# Patient Record
(demographics unavailable — no encounter records)

---

## 2024-10-25 NOTE — BEGINNING OF VISIT
[3] : 2) Feeling down, depressed, or hopeless for nearly every day (3) [PHQ-9 Negative] : PHQ-9 Negative [Nearly Every Day (3)] : 2.) Feeling down, depressed or hopeless? Nearly every day [Several Days (1)] : 4.) Feeling tired or having little energy? Several days [Not at All (0)] : 9.) Thoughts that you would be off dead or of hurting yourself in some way? Not at all [Mild] : Severity of Depression is Mild [Somewhat Difficult] : How difficult have these problems made it for you to do your work, take care of things at home, or get along with people? Somewhat difficult [Provided Coping Management Support] : Provided Coping Management Support [XAH8Gmaga] : 6 [[id number]] : 8 [Pain Scale: ___] : On a scale of 1-10, today the patient's pain is a(n) [unfilled]. [Never] : Never [With Patient/Caregiver] : with Patient/Caregiver [FreeTextEntry7] : na

## 2024-10-25 NOTE — BEGINNING OF VISIT
[3] : 2) Feeling down, depressed, or hopeless for nearly every day (3) [PHQ-9 Negative] : PHQ-9 Negative [Nearly Every Day (3)] : 2.) Feeling down, depressed or hopeless? Nearly every day [Several Days (1)] : 4.) Feeling tired or having little energy? Several days [Not at All (0)] : 9.) Thoughts that you would be off dead or of hurting yourself in some way? Not at all [Mild] : Severity of Depression is Mild [Somewhat Difficult] : How difficult have these problems made it for you to do your work, take care of things at home, or get along with people? Somewhat difficult [Provided Coping Management Support] : Provided Coping Management Support [AOH6Uzoww] : 6 [[id number]] : 8 [Pain Scale: ___] : On a scale of 1-10, today the patient's pain is a(n) [unfilled]. [Never] : Never [With Patient/Caregiver] : with Patient/Caregiver [FreeTextEntry7] : na

## 2024-10-25 NOTE — BEGINNING OF VISIT
[3] : 2) Feeling down, depressed, or hopeless for nearly every day (3) [PHQ-9 Negative] : PHQ-9 Negative [Nearly Every Day (3)] : 2.) Feeling down, depressed or hopeless? Nearly every day [Several Days (1)] : 4.) Feeling tired or having little energy? Several days [Not at All (0)] : 9.) Thoughts that you would be off dead or of hurting yourself in some way? Not at all [Mild] : Severity of Depression is Mild [Somewhat Difficult] : How difficult have these problems made it for you to do your work, take care of things at home, or get along with people? Somewhat difficult [Provided Coping Management Support] : Provided Coping Management Support [[id number]] : 6 [[id number]] : 8 [Pain Scale: ___] : On a scale of 1-10, today the patient's pain is a(n) [unfilled]. [Never] : Never [With Patient/Caregiver] : with Patient/Caregiver [FreeTextEntry7] : na

## 2024-10-28 NOTE — REASON FOR VISIT
[Initial Consultation] : an initial consultation [Family Member] : family member [FreeTextEntry2] : newly diagnosed endometrial cancer

## 2024-10-28 NOTE — HISTORY OF PRESENT ILLNESS
[Disease: _____________________] : Disease: [unfilled] [AJCC Stage: ____] : AJCC Stage: [unfilled] [de-identified] : Madalyn is an 81 year old woman referred by Dr. Bañuelos for newly diagnosed endometrial cancer. She initially had vaginal spotting earlier this year and she went to her gyn who did EMBx which showed cancer and she was referred to Dr. Bañuelos. Due to her multiple medical issues, she was unable to be cleared by cardiology for hysterectomy/staging. History of CHF with EF of 20%  EMB 6/26/2024 [facility] review requested HIGH-GRADE ENDOMETRIAL ADENOCARCINOMA, MOST CONSISTEN WITH CLEAR CELL CARCINOMA *Intact//normal nuclear expression of MMR proteins MLH1, MSH2, MSH6, and PMS2 in tumor cell nuclei (control intact). These results suggest a low probability of Gregory syndrome or sporadic MSI-high tumor.  CT c/a/p 7/31/24: IMPRESSION: Marked endometrial thickening in keeping with stated history of tumor. Small foci of gas, possibly related to recent procedure. Correlate with clinical history. Complex right renal mass with concern for solid nodular component, as on the prior study, concerning for primary renal malignancy.  She reports new vaginal bleeding or 2 weeks. She walks with walker due to fatigue.  No recent falls. She has weakness, unable to complete most ADLs independently.  Prior to CVA she was independent and lived on her own. Since then she lives with her daughter.   No allergies. PMHx: CVA (February 2024), CHF, DM (currently well controlled), gout, Afib, HTN, HLD PSHx:RA LSC cholecystectomy with intraop cholangiography (2021) Family hx: aunt with colon cancer (60s)   with one daughter. No h/o smoking. Social alcohol use.  [de-identified] : Madalyn is dependent on all ADLs at this time. She was not cleared by cardiology for surgery.

## 2024-10-28 NOTE — HISTORY OF PRESENT ILLNESS
[Disease: _____________________] : Disease: [unfilled] [AJCC Stage: ____] : AJCC Stage: [unfilled] [de-identified] : Madalyn is an 81 year old woman referred by Dr. Bañuelos for newly diagnosed endometrial cancer. She initially had vaginal spotting earlier this year and she went to her gyn who did EMBx which showed cancer and she was referred to Dr. Bañuelos. Due to her multiple medical issues, she was unable to be cleared by cardiology for hysterectomy/staging. History of CHF with EF of 20%  EMB 6/26/2024 [facility] review requested HIGH-GRADE ENDOMETRIAL ADENOCARCINOMA, MOST CONSISTEN WITH CLEAR CELL CARCINOMA *Intact//normal nuclear expression of MMR proteins MLH1, MSH2, MSH6, and PMS2 in tumor cell nuclei (control intact). These results suggest a low probability of Gregory syndrome or sporadic MSI-high tumor.  CT c/a/p 7/31/24: IMPRESSION: Marked endometrial thickening in keeping with stated history of tumor. Small foci of gas, possibly related to recent procedure. Correlate with clinical history. Complex right renal mass with concern for solid nodular component, as on the prior study, concerning for primary renal malignancy.  She reports new vaginal bleeding or 2 weeks. She walks with walker due to fatigue.  No recent falls. She has weakness, unable to complete most ADLs independently.  Prior to CVA she was independent and lived on her own. Since then she lives with her daughter.   No allergies. PMHx: CVA (February 2024), CHF, DM (currently well controlled), gout, Afib, HTN, HLD PSHx:RA LSC cholecystectomy with intraop cholangiography (2021) Family hx: aunt with colon cancer (60s)   with one daughter. No h/o smoking. Social alcohol use.  [de-identified] : Madalyn is dependent on all ADLs at this time. She was not cleared by cardiology for surgery.

## 2024-10-28 NOTE — HISTORY OF PRESENT ILLNESS
[Disease: _____________________] : Disease: [unfilled] [AJCC Stage: ____] : AJCC Stage: [unfilled] [de-identified] : Madalyn is an 81 year old woman referred by Dr. Bañuelos for newly diagnosed endometrial cancer. She initially had vaginal spotting earlier this year and she went to her gyn who did EMBx which showed cancer and she was referred to Dr. Bañuelos. Due to her multiple medical issues, she was unable to be cleared by cardiology for hysterectomy/staging. History of CHF with EF of 20%  EMB 6/26/2024 [facility] review requested HIGH-GRADE ENDOMETRIAL ADENOCARCINOMA, MOST CONSISTEN WITH CLEAR CELL CARCINOMA *Intact//normal nuclear expression of MMR proteins MLH1, MSH2, MSH6, and PMS2 in tumor cell nuclei (control intact). These results suggest a low probability of Gregory syndrome or sporadic MSI-high tumor.  CT c/a/p 7/31/24: IMPRESSION: Marked endometrial thickening in keeping with stated history of tumor. Small foci of gas, possibly related to recent procedure. Correlate with clinical history. Complex right renal mass with concern for solid nodular component, as on the prior study, concerning for primary renal malignancy.  She reports new vaginal bleeding or 2 weeks. She walks with walker due to fatigue.  No recent falls. She has weakness, unable to complete most ADLs independently.  Prior to CVA she was independent and lived on her own. Since then she lives with her daughter.   No allergies. PMHx: CVA (February 2024), CHF, DM (currently well controlled), gout, Afib, HTN, HLD PSHx:RA LSC cholecystectomy with intraop cholangiography (2021) Family hx: aunt with colon cancer (60s)   with one daughter. No h/o smoking. Social alcohol use.  [de-identified] : Madalyn is dependent on all ADLs at this time. She was not cleared by cardiology for surgery.

## 2024-10-28 NOTE — CONSULT LETTER
[Dear  ___] : Dear  [unfilled], [Consult Letter:] : I had the pleasure of evaluating your patient, [unfilled]. [Consult Closing:] : Thank you very much for allowing me to participate in the care of this patient.  If you have any questions, please do not hesitate to contact me. [Sincerely,] : Sincerely, [DrLuis  ___] : Dr. SOLORZANO

## 2024-10-30 NOTE — VITALS
[Maximal Pain Intensity: 0/10] : 0/10 [70: Cares for self; unalbe to carry on normal activity or do active work.] : 70: Cares for self; unable to carry on normal activity or do active work. [Date: ____________] : Patient's last distress assessment performed on [unfilled]. [7 - Distress Level] : Distress Level: 7

## 2024-11-06 NOTE — REVIEW OF SYSTEMS
[Fatigue] : fatigue [Constipation] : constipation [Difficulty Walking] : difficulty walking [Negative] : Heme/Lymph [FreeTextEntry8] : as noted  [de-identified] : uses walker

## 2024-11-06 NOTE — PHYSICAL EXAM
[Normal] : well developed, well nourished, in no acute distress [] : no respiratory distress [Respiration, Rhythm And Depth] : normal respiratory rhythm and effort [Edema] : no peripheral edema present [Abdomen Soft] : soft [Abdomen Tenderness] : non-tender [Oriented To Time, Place, And Person] : oriented to person, place, and time [Affect] : the affect was normal [de-identified] : ambulates with walker

## 2024-11-06 NOTE — HISTORY OF PRESENT ILLNESS
[FreeTextEntry1] : Ms. 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   Her HPI as I understand it is summarized below:  She initially had vaginal spotting earlier this year. An EMBx showed high-grade endometrial adenocarcinoma most consistent with clear cell carcinoma, pMMR, and she was referred to Dr. Bañuelos by her gyn. Unfortunately, she was not cleared for surgery by her cardiologist.   She had a CT C/A/P on 7/31/24 which showed marked endometrial thickening as well as a complex right renal mass with concern for solid nodular component concerning for a primary renal mass.   10/25/24: Saw Dr. Werner in consultation- recommended against chemotherapy at this time due to her CHF. Recommended local therapy with RT with the possibility of adjuvant single agent chemotherapy if there is residual disease after RT.   10/30/24: Presents today to discuss radiation therapy for her incompletely staged UPSC. She is accompanied by her daughter. She is generally feeling well, although fatigued. She had a few episodes of vaginal bleeding since May, 2024; she currently has light vaginal bleeding that has been ongoing for the past 2-3 weeks. She has no abdominal or pelvic pain, some constipation, no diarrhea, no urinary issues.    PMHx: CVA (February 2024), HFrEF (20%), DM (currently well controlled), gout, Afib, HTN, HLD PSHx:RA LSC cholecystectomy with intraop cholangiography (2021) Family hx: aunt with colon cancer (60s)

## 2024-11-06 NOTE — HISTORY OF PRESENT ILLNESS
[FreeTextEntry1] : Ms. 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   Her HPI as I understand it is summarized below:  She initially had vaginal spotting earlier this year. An EMBx showed high-grade endometrial adenocarcinoma most consistent with clear cell carcinoma, pMMR, and she was referred to Dr. Bañuelos by her gyn. Unfortunately, she was not cleared for surgery by her cardiologist.   She had a CT C/A/P on 7/31/24 which showed marked endometrial thickening as well as a complex right renal mass with concern for solid nodular component concerning for a primary renal mass.   10/25/24: Saw Dr. Werner in consultation- recommended against chemotherapy at this time due to her CHF. Recommended local therapy with RT with the possibility of adjuvant single agent chemotherapy if there is residual disease after RT.   10/30/24: Presents today to discuss radiation therapy for her incompletely staged UPSC. She is accompanied by her daughter. She is generally feeling well, although fatigued. She had a few episodes of vaginal bleeding since May, 2024; she currently has light vaginal bleeding that has been ongoing for the past 2-3 weeks. She has no abdominal or pelvic pain, some constipation, no diarrhea, no urinary issues.    PMHx: CVA (February 2024), HFrEF (20%), DM (currently well controlled), gout, Afib, HTN, HLD PSHx:RA LSC cholecystectomy with intraop cholangiography (2021) Family hx: aunt with colon cancer (60s)

## 2024-11-06 NOTE — REVIEW OF SYSTEMS
[Fatigue] : fatigue [Constipation] : constipation [Difficulty Walking] : difficulty walking [Negative] : Heme/Lymph [FreeTextEntry8] : as noted  [de-identified] : uses walker

## 2024-11-06 NOTE — PHYSICAL EXAM
[Normal] : well developed, well nourished, in no acute distress [] : no respiratory distress [Respiration, Rhythm And Depth] : normal respiratory rhythm and effort [Edema] : no peripheral edema present [Abdomen Soft] : soft [Abdomen Tenderness] : non-tender [Oriented To Time, Place, And Person] : oriented to person, place, and time [Affect] : the affect was normal [de-identified] : ambulates with walker

## 2025-01-14 NOTE — HISTORY OF PRESENT ILLNESS
[FreeTextEntry1] : Ms. 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 She consulted with medical oncology with recommendations against chemotherapy due to her CHF.   She is currently receiving pelvic radiation   1/14/25:

## 2025-01-14 NOTE — DISEASE MANAGEMENT
[Clinical] : TNM Stage: c [TTNM] : x [NTNM] : x [MTNM] : x [N/A] : Currently not applicable [de-identified] : [id number] [[place] [id number]] : [id number] [de-identified] : pelvis

## 2025-01-21 NOTE — REVIEW OF SYSTEMS
[Constipation: Grade 0] : Constipation: Grade 0 [Diarrhea: Grade 0] : Diarrhea: Grade 0 [Nausea: Grade 1 - Loss of appetite without alteration in eating habits] : Nausea: Grade 1 - Loss of appetite without alteration in eating habits [Vomiting: Grade 0] : Vomiting: Grade 0 [Fatigue: Grade 1 - Fatigue relieved by rest] : Fatigue: Grade 1 - Fatigue relieved by rest [Hematuria: Grade 0] : Hematuria: Grade 0

## 2025-01-28 NOTE — REVIEW OF SYSTEMS
[Constipation: Grade 0] : Constipation: Grade 0 [Diarrhea: Grade 0] : Diarrhea: Grade 0 [Nausea: Grade 1 - Loss of appetite without alteration in eating habits] : Nausea: Grade 1 - Loss of appetite without alteration in eating habits [Vomiting: Grade 0] : Vomiting: Grade 0 [de-identified] : In car while drinking water [Fatigue: Grade 1 - Fatigue relieved by rest] : Fatigue: Grade 1 - Fatigue relieved by rest [Hematuria: Grade 0] : Hematuria: Grade 0

## 2025-01-28 NOTE — HISTORY OF PRESENT ILLNESS
[FreeTextEntry1] : Ms. 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 She consulted with medical oncology with recommendations against chemotherapy due to her CHF.   She is currently receiving pelvic radiation   1/14/25:   1/21/25 Patient here for OTX  6/25fx Patient tolerating radiation.  Reports occasional intermittent bleeding.  Also having some"car sickness" on the way here   1/28/25:  CBC 1/21/25 H/H 9/1/30.8

## 2025-01-28 NOTE — DISEASE MANAGEMENT
[Clinical] : TNM Stage: c [TTNM] : x [NTNM] : x [MTNM] : x [N/A] : Currently not applicable [de-identified] : [id number] [[place] [id number]] : [id number] [de-identified] : pelvis

## 2025-01-29 NOTE — DISEASE MANAGEMENT
[TTNM] : x [NTNM] : x [MTNM] : x [de-identified] : [id number] [[place] [id number]] : [id number] [de-identified] : pelvis

## 2025-01-29 NOTE — REVIEW OF SYSTEMS
[Diarrhea: Grade 1 - Increase of <4 stools per day over baseline; mild increase in ostomy output compared to baseline] : Diarrhea: Grade 1 - Increase of <4 stools per day over baseline; mild increase in ostomy output compared to baseline [Urinary Incontinence: Grade 1 - Occasional (e.g., with coughing, sneezing, etc.), pads not indicated] : Urinary Incontinence: Grade 1 - Occasional (e.g., with coughing, sneezing, etc.), pads not indicated [Urinary Retention: Grade 0] : Urinary Retention: Grade 0 [Urinary Tract Pain: Grade 0] : Urinary Tract Pain: Grade 0 [Urinary Urgency: Grade 0] : Urinary Urgency: Grade 0 [Urinary Frequency: Grade 0] : Urinary Frequency: Grade 0 [Pruritus: Grade 0] : Pruritus: Grade 0 [Dermatitis Radiation: Grade 0] : Dermatitis Radiation: Grade 0 [de-identified] : In car while drinking water, improved with Zofran  [FreeTextEntry2] : baseline urinary leakage

## 2025-01-29 NOTE — HISTORY OF PRESENT ILLNESS
[FreeTextEntry1] : Ms. 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 She consulted with medical oncology with recommendations against chemotherapy due to her CHF.   She is currently receiving pelvic radiation.   1/21/25: Patient here for OTV,  6/25 fx. Patient tolerating radiation.  Reports occasional intermittent bleeding.  Also having some"car sickness" on the way here.   1/29/25: Presents for OTV, fx 12/25 today.  CBC 1/21/25 H/H 9.1/30.8 - repeated today. She has persistent mild vaginal bleeding, although improved.  Reports nausea in AM, usually while in the car travelling to her treatment. Today she increased Zofran to 8mg and had no nausea. Tolerating regular diet.  No urinary complaints. Occasional bouts of diarrhea. No abdominal or pelvic pain.

## 2025-02-04 NOTE — REVIEW OF SYSTEMS
[Constipation: Grade 0] : Constipation: Grade 0 [Diarrhea: Grade 1 - Increase of <4 stools per day over baseline; mild increase in ostomy output compared to baseline] : Diarrhea: Grade 1 - Increase of <4 stools per day over baseline; mild increase in ostomy output compared to baseline [Nausea: Grade 1 - Loss of appetite without alteration in eating habits] : Nausea: Grade 1 - Loss of appetite without alteration in eating habits [Vomiting: Grade 0] : Vomiting: Grade 0 [Fatigue: Grade 1 - Fatigue relieved by rest] : Fatigue: Grade 1 - Fatigue relieved by rest [Hematuria: Grade 0] : Hematuria: Grade 0 [Urinary Incontinence: Grade 1 - Occasional (e.g., with coughing, sneezing, etc.), pads not indicated] : Urinary Incontinence: Grade 1 - Occasional (e.g., with coughing, sneezing, etc.), pads not indicated [Urinary Retention: Grade 0] : Urinary Retention: Grade 0 [Urinary Tract Pain: Grade 0] : Urinary Tract Pain: Grade 0 [Urinary Urgency: Grade 0] : Urinary Urgency: Grade 0 [Urinary Frequency: Grade 0] : Urinary Frequency: Grade 0 [Pruritus: Grade 0] : Pruritus: Grade 0 [Dermatitis Radiation: Grade 0] : Dermatitis Radiation: Grade 0 [de-identified] : In car while drinking water, improved with Zofran  [FreeTextEntry2] : baseline urinary leakage

## 2025-02-04 NOTE — DISEASE MANAGEMENT
[Clinical] : TNM Stage: c [N/A] : Currently not applicable [TTNM] : x [NTNM] : x [MTNM] : x [de-identified] : [id number] [[place] [id number]] : [id number] [de-identified] : pelvis

## 2025-02-04 NOTE — HISTORY OF PRESENT ILLNESS
[FreeTextEntry1] : Ms. 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 She consulted with medical oncology with recommendations against chemotherapy due to her CHF.   She is currently receiving pelvic radiation.   1/21/25: Patient here for OTV,  6/25 fx. Patient tolerating radiation.  Reports occasional intermittent bleeding.  Also having some"car sickness" on the way here.   1/29/25: Presents for OTV, fx 12/25 today.  CBC 1/21/25 H/H 9.1/30.8 - repeated today. She has persistent mild vaginal bleeding, although improved.  Reports nausea in AM, usually while in the car travelling to her treatment. Today she increased Zofran to 8mg and had no nausea. Tolerating regular diet.  No urinary complaints. Occasional bouts of diarrhea. No abdominal or pelvic pain.   2/4/25: Tolerating treatment. Nausea has improved since increasing Zofran from 4 to 8mg. Reports fatigue. Reports one episode of diarrhea, took Imodium which helped. CBC from 1/29/25: H/H 9.1/31.1- stable

## 2025-02-04 NOTE — REVIEW OF SYSTEMS
[Constipation: Grade 0] : Constipation: Grade 0 [Diarrhea: Grade 1 - Increase of <4 stools per day over baseline; mild increase in ostomy output compared to baseline] : Diarrhea: Grade 1 - Increase of <4 stools per day over baseline; mild increase in ostomy output compared to baseline [Nausea: Grade 1 - Loss of appetite without alteration in eating habits] : Nausea: Grade 1 - Loss of appetite without alteration in eating habits [Vomiting: Grade 0] : Vomiting: Grade 0 [Fatigue: Grade 1 - Fatigue relieved by rest] : Fatigue: Grade 1 - Fatigue relieved by rest [Hematuria: Grade 0] : Hematuria: Grade 0 [Urinary Incontinence: Grade 1 - Occasional (e.g., with coughing, sneezing, etc.), pads not indicated] : Urinary Incontinence: Grade 1 - Occasional (e.g., with coughing, sneezing, etc.), pads not indicated [Urinary Retention: Grade 0] : Urinary Retention: Grade 0 [Urinary Tract Pain: Grade 0] : Urinary Tract Pain: Grade 0 [Urinary Urgency: Grade 0] : Urinary Urgency: Grade 0 [Urinary Frequency: Grade 0] : Urinary Frequency: Grade 0 [Pruritus: Grade 0] : Pruritus: Grade 0 [Dermatitis Radiation: Grade 0] : Dermatitis Radiation: Grade 0 [de-identified] : In car while drinking water, improved with Zofran  [FreeTextEntry2] : baseline urinary leakage

## 2025-02-04 NOTE — HISTORY OF PRESENT ILLNESS
[FreeTextEntry1] : Ms. 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 She consulted with medical oncology with recommendations against chemotherapy due to her CHF.   She is currently receiving pelvic radiation.   1/21/25: Patient here for OTV,  6/25 fx. Patient tolerating radiation.  Reports occasional intermittent bleeding.  Also having some"car sickness" on the way here.   1/29/25: Presents for OTV, fx 12/25 today.  CBC 1/21/25 H/H 9.1/30.8 - repeated today. She has persistent mild vaginal bleeding, although improved.  Reports nausea in AM, usually while in the car travelling to her treatment. Today she increased Zofran to 8mg and had no nausea. Tolerating regular diet.  No urinary complaints. Occasional bouts of diarrhea. No abdominal or pelvic pain.   2/4/25: Tolerating treatment. Nausea has improved since increasing Zofran from 4 to 8mg. Reports fatigue. Reports one episode of diarrhea, took Imodium which helped. CBC from 1/29/25: H/H 9.1/31.1- stable

## 2025-02-04 NOTE — DISEASE MANAGEMENT
[Clinical] : TNM Stage: c [N/A] : Currently not applicable [TTNM] : x [NTNM] : x [MTNM] : x [de-identified] : [id number] [[place] [id number]] : [id number] [de-identified] : pelvis

## 2025-02-18 NOTE — DISEASE MANAGEMENT
[Clinical] : TNM Stage: c [N/A] : Currently not applicable [TTNM] : x [NTNM] : x [MTNM] : x [de-identified] : [id number] [[place] [address]] : [phone] [de-identified] : pelvis

## 2025-02-18 NOTE — REVIEW OF SYSTEMS
[Constipation: Grade 0] : Constipation: Grade 0 [Diarrhea: Grade 1 - Increase of <4 stools per day over baseline; mild increase in ostomy output compared to baseline] : Diarrhea: Grade 1 - Increase of <4 stools per day over baseline; mild increase in ostomy output compared to baseline [Nausea: Grade 1 - Loss of appetite without alteration in eating habits] : Nausea: Grade 1 - Loss of appetite without alteration in eating habits [Vomiting: Grade 0] : Vomiting: Grade 0 [Fatigue: Grade 1 - Fatigue relieved by rest] : Fatigue: Grade 1 - Fatigue relieved by rest [Hematuria: Grade 0] : Hematuria: Grade 0 [Urinary Incontinence: Grade 1 - Occasional (e.g., with coughing, sneezing, etc.), pads not indicated] : Urinary Incontinence: Grade 1 - Occasional (e.g., with coughing, sneezing, etc.), pads not indicated [Urinary Retention: Grade 0] : Urinary Retention: Grade 0 [Urinary Tract Pain: Grade 0] : Urinary Tract Pain: Grade 0 [Urinary Urgency: Grade 0] : Urinary Urgency: Grade 0 [Urinary Frequency: Grade 0] : Urinary Frequency: Grade 0 [Pruritus: Grade 0] : Pruritus: Grade 0 [Dermatitis Radiation: Grade 0] : Dermatitis Radiation: Grade 0 [de-identified] : In car while drinking water, improved with Zofran  [FreeTextEntry2] : baseline urinary leakage

## 2025-02-18 NOTE — VITALS
[Maximal Pain Intensity: 0/10] : 0/10 [Least Pain Intensity: 0/10] : 0/10 [70: Cares for self; unalbe to carry on normal activity or do active work.] : 70: Cares for self; unable to carry on normal activity or do active work. normal (ped)...

## 2025-02-18 NOTE — HISTORY OF PRESENT ILLNESS
[FreeTextEntry1] : Ms. 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 She consulted with medical oncology with recommendations against chemotherapy due to her CHF.   She is currently receiving pelvic radiation.   1/21/25: Patient here for OTV,  6/25 fx. Patient tolerating radiation.  Reports occasional intermittent bleeding.  Also having some"car sickness" on the way here.   1/29/25: Presents for OTV, fx 12/25 today.  CBC 1/21/25 H/H 9.1/30.8 - repeated today. She has persistent mild vaginal bleeding, although improved.  Reports nausea in AM, usually while in the car travelling to her treatment. Today she increased Zofran to 8mg and had no nausea. Tolerating regular diet.  No urinary complaints. Occasional bouts of diarrhea. No abdominal or pelvic pain.   2/4/25: Tolerating treatment. Nausea has improved since increasing Zofran from 4 to 8mg. Reports fatigue. Reports one episode of diarrhea, took Imodium which helped. CBC from 1/29/25: H/H 9.1/31.1- stable  2/11/25: Tolerating treatment. Occasional nausea, taking Zofran 8mg. She lost 4lbs since last week, appetite is decreased. She usually has 2 meals a day, brunch and dinner because she wakes up late. Will refer to RD. Had episode of diarrhea subsided with Imodium. Reports vaginal spotting, improved from baseline.  MRI pending 2/27/25. Increasing BUN/creatinine- requested for nephrology consult, but family states they saw someone prior to starting RT.- [facility]- Dr. Leslie., [facility]UV scheduled in May 2025- advised daughter to reschedule to an earlier date.   2/18/2025 OTV. 24/25 fractions. Tolerating treatment. Nausea under control. Reports increase in urinary frequency also has diarrhea once a week which is relieved with imodium.

## 2025-03-07 NOTE — HISTORY OF PRESENT ILLNESS
[FreeTextEntry1] : Ms. 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 She consulted with medical oncology with recommendations against chemotherapy due to her CHF. She received pelvic radiation to a total dose of 4500 cGy in 25 fractions. Treatment was completed on February 2025.   Today I discussed with Ms. Oro daughter the results of the MRI showing some response. we discussed continuing on with treatment to the area of residual disease.  They are agrreable and we will schedule.

## 2025-03-07 NOTE — HISTORY OF PRESENT ILLNESS
[FreeTextEntry1] : Ms. 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 She consulted with medical oncology with recommendations against chemotherapy due to her CHF. She received pelvic radiation to a total dose of 4500 cGy in 25 fractions. Treatment was completed on February 2025.   Today I discussed with Ms. Oro daughter the results of the MRI showing some response. we discussed continuing on with treatment to the area of residual disease.  They are agrreable and we will schedule.

## 2025-03-27 NOTE — HISTORY OF PRESENT ILLNESS
[FreeTextEntry1] : Ms. 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 She consulted with medical oncology with recommendations against chemotherapy due to her CHF. She received pelvic radiation to a total dose of 4500 cGy in 25 fractions. Treatment was completed on 2/19/25.   MRI pelvis 2/27/25:  *  Decrease in size of an endometrial mass since the previous MRI of 11/13/2024. *  Material compatible with blood products within the endometrial cavity. *  Uterine fibroids. *  Mild mural thickening of the rectosigmoid colon and distal descending colon, which may be secondary underdistention or colitis. *  A right renal mass measuring 2 cm is unchanged in size since 11/13/2024.  3/4/25: Today I discussed with Ms. Roque daughter the results of the MRI showing some response. We discussed continuing on with treatment to the area of residual disease. They are agreeable and we will schedule.  4/2/25: Presents for follow-up. Nephrology?

## 2025-07-15 NOTE — DISEASE MANAGEMENT
[Clinical] : TNM Stage: c [N/A] : Currently not applicable [TTNM] : x [NTNM] : x [MTNM] : x [de-identified] : 3255 [de-identified] : 7705 [de-identified] : uterine

## 2025-07-15 NOTE — HISTORY OF PRESENT ILLNESS
[FreeTextEntry1] : Ms. 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 She consulted with medical oncology with recommendations against chemotherapy due to her CHF. She received pelvic radiation to a total dose of 4500 cGy in 25 fractions. Treatment was completed on 2/19/25.  MRI pelvis 2/27/25: * Decrease in size of an endometrial mass since the previous MRI of 11/13/2024. * Material compatible with blood products within the endometrial cavity. * Uterine fibroids. * Mild mural thickening of the rectosigmoid colon and distal descending colon, which may be secondary underdistention or colitis. * A right renal mass measuring 2 cm is unchanged in size since 11/13/2024.  7/8/25: She completes treatment for uterine boost today.  Feeling well. No bleeding.

## 2025-07-15 NOTE — DISEASE MANAGEMENT
[Clinical] : TNM Stage: c [N/A] : Currently not applicable [TTNM] : x [NTNM] : x [MTNM] : x [de-identified] : 4417 [de-identified] : 1495 [de-identified] : uterine